# Patient Record
Sex: FEMALE | Race: BLACK OR AFRICAN AMERICAN | Employment: FULL TIME | URBAN - METROPOLITAN AREA
[De-identification: names, ages, dates, MRNs, and addresses within clinical notes are randomized per-mention and may not be internally consistent; named-entity substitution may affect disease eponyms.]

---

## 2017-03-27 ENCOUNTER — OFFICE VISIT (OUTPATIENT)
Dept: FAMILY MEDICINE CLINIC | Age: 27
End: 2017-03-27

## 2017-03-27 VITALS
HEIGHT: 60 IN | BODY MASS INDEX: 27.48 KG/M2 | WEIGHT: 140 LBS | OXYGEN SATURATION: 97 % | RESPIRATION RATE: 18 BRPM | DIASTOLIC BLOOD PRESSURE: 71 MMHG | TEMPERATURE: 97.7 F | HEART RATE: 76 BPM | SYSTOLIC BLOOD PRESSURE: 111 MMHG

## 2017-03-27 DIAGNOSIS — R51.9 HEADACHE, UNSPECIFIED HEADACHE TYPE: Primary | ICD-10-CM

## 2017-03-27 DIAGNOSIS — M62.838 MUSCLE SPASM: ICD-10-CM

## 2017-03-27 NOTE — MR AVS SNAPSHOT
Visit Information Date & Time Provider Department Dept. Phone Encounter #  
 3/27/2017 10:30 AM Veronica Farfan MD [facility] [phone] [id number] Follow-up Instructions Return in about 3 months (around 6/27/2017), or if symptoms worsen or fail to improve, for headache. Upcoming Health Maintenance Date Due  
 HPV AGE 9Y-34Y (1 of 3 - Female 3 Dose Series) 8/22/2002 DTaP/Tdap/Td series (1 - Tdap) 8/22/2012 PAP AKA CERVICAL CYTOLOGY 8/22/2012 Allergies as of 3/27/2017  Review Complete On: 3/27/2017 By: Juanita Alcocer RN No Known Allergies Current Immunizations  Never Reviewed No immunizations on file. Not reviewed this visit You Were Diagnosed With   
  
 Codes Comments Headache, unspecified headache type    -  Primary ICD-10-CM: R51 ICD-9-CM: 784.0 Muscle spasm     ICD-10-CM: I39.343 ICD-9-CM: 728.85 Vitals BP Pulse Temp Resp Height(growth percentile) Weight(growth percentile) 111/71 (BP 1 Location: Left arm, BP Patient Position: Sitting) 76 97.7 °F (36.5 °C) (Oral) 18 5' (1.524 m) 140 lb (63.5 kg) LMP SpO2 BMI OB Status Smoking Status 02/27/2017 97% 27.34 kg/m2 Having regular periods Current Every Day Smoker BMI and BSA Data Body Mass Index Body Surface Area  
 27.34 kg/m 2 1.64 m 2 Your Updated Medication List  
  
   
This list is accurate as of: 3/27/17 11:12 AM.  Always use your most recent med list.  
  
  
  
  
 aspirin-acetaminophen-caffeine 250-250-65 mg per tablet Commonly known as:  EXCEDRIN ES Take 1 Tab by mouth every six (6) hours as needed. Indications: HEADACHE DISORDER  
  
 miconazole 2 % vaginal cream  
Commonly known as:  MONISTAT 7 Insert 1 Applicator into vagina nightly. Follow-up Instructions Return in about 3 months (around 6/27/2017), or if symptoms worsen or fail to improve, for headache. Patient Instructions Headache: Care Instructions Your Care Instructions Headaches have many possible causes. Most headaches aren't a sign of a more serious problem, and they will get better on their own. Home treatment may help you feel better faster. The doctor has checked you carefully, but problems can develop later. If you notice any problems or new symptoms, get medical treatment right away. Follow-up care is a key part of your treatment and safety. Be sure to make and go to all appointments, and call your doctor if you are having problems. It's also a good idea to know your test results and keep a list of the medicines you take. How can you care for yourself at home? · Do not drive if you have taken a prescription pain medicine. · Rest in a quiet, dark room until your headache is gone. Close your eyes and try to relax or go to sleep. Don't watch TV or read. · Put a cold, moist cloth or cold pack on the painful area for 10 to 20 minutes at a time. Put a thin cloth between the cold pack and your skin. · Use a warm, moist towel or a heating pad set on low to relax tight shoulder and neck muscles. · Have someone gently massage your neck and shoulders. · Take pain medicines exactly as directed. ¨ If the doctor gave you a prescription medicine for pain, take it as prescribed. ¨ If you are not taking a prescription pain medicine, ask your doctor if you can take an over-the-counter medicine. · Be careful not to take pain medicine more often than the instructions allow, because you may get worse or more frequent headaches when the medicine wears off. · Do not ignore new symptoms that occur with a headache, such as a fever, weakness or numbness, vision changes, or confusion. These may be signs of a more serious problem. To prevent headaches · Keep a headache diary so you can figure out what triggers your headaches. Avoiding triggers may help you prevent headaches.  Record when each headache began, how long it lasted, and what the pain was like (throbbing, aching, stabbing, or dull). Write down any other symptoms you had with the headache, such as nausea, flashing lights or dark spots, or sensitivity to bright light or loud noise. Note if the headache occurred near your period. List anything that might have triggered the headache, such as certain foods (chocolate, cheese, wine) or odors, smoke, bright light, stress, or lack of sleep. · Find healthy ways to deal with stress. Headaches are most common during or right after stressful times. Take time to relax before and after you do something that has caused a headache in the past. 
· Try to keep your muscles relaxed by keeping good posture. Check your jaw, face, neck, and shoulder muscles for tension, and try relaxing them. When sitting at a desk, change positions often, and stretch for 30 seconds each hour. · Get plenty of sleep and exercise. · Eat regularly and well. Long periods without food can trigger a headache. · Treat yourself to a massage. Some people find that regular massages are very helpful in relieving tension. · Limit caffeine by not drinking too much coffee, tea, or soda. But don't quit caffeine suddenly, because that can also give you headaches. · Reduce eyestrain from computers by blinking frequently and looking away from the computer screen every so often. Make sure you have proper eyewear and that your monitor is set up properly, about an arm's length away. · Seek help if you have depression or anxiety. Your headaches may be linked to these conditions. Treatment can both prevent headaches and help with symptoms of anxiety or depression. When should you call for help? Call 911 anytime you think you may need emergency care. For example, call if: 
· You have signs of a stroke. These may include: 
¨ Sudden numbness, paralysis, or weakness in your face, arm, or leg, especially on only one side of your body. ¨ Sudden vision changes. ¨ Sudden trouble speaking. ¨ Sudden confusion or trouble understanding simple statements. ¨ Sudden problems with walking or balance. ¨ A sudden, severe headache that is different from past headaches. Call your doctor now or seek immediate medical care if: 
· You have a new or worse headache. · Your headache gets much worse. Where can you learn more? Go to http://rashid-saul.info/. Enter M271 in the search box to learn more about \"Headache: Care Instructions. \" Current as of: February 19, 2016 Content Version: 11.1 © 5053-1862 [employer]. Care instructions adapted under license by [employer] (which disclaims liability or warranty for this information). If you have questions about a medical condition or this instruction, always ask your healthcare professional. [address] any warranty or liability for your use of this information. Introducing [facility]! Edna Day introduces [employer] patient portal. Now you can access parts of your medical record, email your doctor's office, and request medication refills online. 1. In your internet browser, go to [email or web address] 2. Click on the First Time User? Click Here link in the Sign In box. You will see the New Member Sign Up page. 3. Enter your [employer] Access Code exactly as it appears below. You will not need to use this code after youve completed the sign-up process. If you do not sign up before the expiration date, you must request a new code. · [employer] Access Code: [email or web address] Expires: 6/25/2017 11:12 AM 
 
4. Enter the last four digits of your Social Security Number (xxxx) and Date of Birth (mm/dd/yyyy) as indicated and click Submit. You will be taken to the next sign-up page. 5. Create a [employer]ming ID. This will be your [employer] login ID and cannot be changed, so think of one that is secure and easy to remember. 6. Create a [employer] password. You can change your password at any time. 7. Enter your Password Reset Question and Answer. This can be used at a later time if you forget your password. 8. Enter your e-mail address. You will receive e-mail notification when new information is available in [address] 9. Click Sign Up. You can now view and download portions of your medical record. 10. Click the Download Summary menu link to download a portable copy of your medical information. If you have questions, please visit the Frequently Asked Questions section of the [employer] website. Remember, [employer] is NOT to be used for urgent needs. For medical emergencies, dial 911. Now available from your iPhone and Android! Please provide this summary of care documentation to your next provider. Your primary care clinician is listed as NONE. If you have any questions after today's visit, please call [phone].

## 2017-03-27 NOTE — PATIENT INSTRUCTIONS

## 2017-03-27 NOTE — LETTER
NOTIFICATION RETURN TO WORK 
 
3/27/2017 11:08 AM 
 
Ms. Sabine Sarkar [facility] [address] [place] [address] To Whom It May Concern: 
 
Sabine Sarkar is currently under the care of [address] Patient seen today. She has a history of headache and muscle spasms in the past but has not had these in more than 6 months. She says she is stable enough to return to work. Her physical exam is stable. She is stable and may return to work. If there are questions or concerns please have the patient contact our office. Sincerely, Janice Art MD

## 2017-03-27 NOTE — PROGRESS NOTES
HPI  Camilo Davis comes in to establish care. She would like a note to allow her to go back to work. patient has a previous h/o headaches. 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 She has been doing the required exercises. She did have a h/o headaches in the past. Has not had these for more than 6 months. She also had a h/o muscle spasms. She would like to go back to work. Was out due to the headaches. She did not see any physician for these and headaches did resolve. Denies any focal weakness, numbness or tingling or changes in vision. Past Medical History  No past medical history on file. Surgical History  Past Surgical History:   Procedure Laterality Date    HX WISDOM TEETH EXTRACTION          Medications  Current Outpatient Prescriptions   Medication Sig Dispense Refill    aspirin-acetaminophen-caffeine 250-250-65 mg per tablet Take 1 Tab by mouth every six (6) hours as needed. Indications: HEADACHE DISORDER      miconazole (MONISTAT 7) 2 % vaginal cream Insert 1 Applicator into vagina nightly. 45 g 0       Allergies  No Known Allergies    Family History  No family history on file.     Social History  Social History     Social History    Marital status: SINGLE     Spouse name: N/A    Number of children: N/A    Years of education: N/A     Occupational History    [employer]      Social History Main Topics    Smoking status: Current Every Day Smoker    Smokeless tobacco: Not on file    Alcohol use Yes      Comment: drinks rare    Drug use: No    Sexual activity: Yes     Partners: Male     Birth control/ protection: Condom     Other Topics Concern     Service Yes    Blood Transfusions No    Caffeine Concern No    Occupational Exposure No    Hobby Hazards No    Sleep Concern No    Stress Concern No    Weight Concern No    Special Diet No    Back Care No    Exercise No    Bike Helmet No    Seat Belt Yes    Self-Exams Yes     Social History Narrative       Review of Systems  Review of Systems - History obtained from the patient  General ROS: negative for - chills, fatigue, fever or malaise  Psychological ROS: negative  Ophthalmic ROS: negative for - blurry vision, decreased vision, loss of vision or photophobia  ENT ROS: negative  Endocrine ROS: negative  Respiratory ROS: no cough, shortness of breath, or wheezing  Cardiovascular ROS: no chest pain or dyspnea on exertion  Gastrointestinal ROS: no abdominal pain, change in bowel habits, or black or bloody stools  Genito-Urinary ROS: no dysuria, trouble voiding, or hematuria  Musculoskeletal ROS: negative  Neurological ROS: negative for - dizziness, gait disturbance, headaches, impaired coordination/balance, memory loss or numbness/tingling    Vital Signs  Visit Vitals    /71 (BP 1 Location: Left arm, BP Patient Position: Sitting)    Pulse 76    Temp 97.7 °F (36.5 °C) (Oral)    Resp 18    Ht 5' (1.524 m)    Wt 140 lb (63.5 kg)    LMP 02/27/2017    SpO2 97%    BMI 27.34 kg/m2         Physical Exam  Physical Examination: General appearance - alert, well appearing, and in no distress, oriented to person, place, and time, acyanotic, in no respiratory distress and well hydrated  Mental status - alert, oriented to person, place, and time, normal mood, behavior, speech, dress, motor activity, and thought processes  Eyes - pupils equal and reactive, extraocular eye movements intact, sclera anicteric, funduscopic exam normal, discs flat and sharp  Nose - normal and patent, no erythema, discharge or polyps  Mouth - mucous membranes moist, pharynx normal without lesions  Neck - supple, no significant adenopathy  Lymphatics - no palpable lymphadenopathy, no hepatosplenomegaly  Chest - clear to auscultation, no wheezes, rales or rhonchi, symmetric air entry  Heart - normal rate, regular rhythm, normal S1, S2, no murmurs, rubs, clicks or gallops  Abdomen - soft, nontender, nondistended, no masses or organomegaly  Back exam - full range of motion, no tenderness, palpable spasm or pain on motion  Neurological - alert, oriented, normal speech, no focal findings or movement disorder noted  Musculoskeletal - no joint tenderness, deformity or swelling  Extremities - peripheral pulses normal, no pedal edema, no clubbing or cyanosis    Diagnostics  No orders of the defined types were placed in this encounter. Results  Results for orders placed or performed during the hospital encounter of 02/20/14   WET PREP   Result Value Ref Range    Specimen Description: VAGINA     Special Requests: NO SPECIAL REQUESTS     Wet prep NO YEAST,TRICHOMONAS OR CLUE CELLS NOTED     Report Status 02/20/2014 FINAL    CHLAMYDIA/GC AMPLIFIED   Result Value Ref Range    Sample type SWAB     Source ENDOCERVICAL     Chlamydia amplified NEGATIVE  NEGATIVE    N. gonorrhea, amplified NEGATIVE  NEGATIVE    Comment (NOTE)    URINALYSIS W/ RFLX MICROSCOPIC   Result Value Ref Range    Color YELLOW     Appearance HAZY     Specific gravity 1.025 1.003 - 1.030      pH (UA) 6.0 5.0 - 8.0      Protein NEGATIVE  NEGATIVE MG/DL    Glucose NEGATIVE  NEGATIVE MG/DL    Ketone NEGATIVE  NEGATIVE MG/DL    Bilirubin NEGATIVE  NEGATIVE    Blood TRACE (A) NEGATIVE    Urobilinogen 0.2 0.2 - 1.0 EU/DL    Nitrites NEGATIVE  NEGATIVE    Leukocyte Esterase MODERATE (A) NEGATIVE   URINE MICROSCOPIC ONLY   Result Value Ref Range    WBC 21 to 30 0 - 5 /HPF    RBC 0 to 3 0 - 5 /HPF    Epithelial cells 2+ 0 - 5 /LPF    Bacteria 2+ (A) NEGATIVE /HPF   HCG URINE, QL. - POC   Result Value Ref Range    Pregnancy test,urine (POC) NEGATIVE  NEGATIVE       ASSESSMENT and PLAN    ICD-10-CM ICD-9-CM    1. Headache, unspecified headache type R51 784.0    2.  Muscle spasm M62.838 728.85      current treatment plan is effective, no change in therapy  reviewed diet, exercise and weight control  reviewed medications and side effects in detail    I have discussed the diagnosis with the patient and the intended plan of care as seen in the above orders. The patient has received an after-visit summary and questions were answered concerning future plans. I have discussed medication, side effects, and warnings with the patient in detail. The patient verbalized understanding and is in agreement with the plan of care. The patient will contact the office with any additional concerns.     Rocco Hauser MD